# Patient Record
(demographics unavailable — no encounter records)

---

## 2024-12-31 NOTE — HISTORY OF PRESENT ILLNESS
[FreeTextEntry1] : pt here for annual exam on cryselle uses it 2-3 months her lmp 12/24/2024 doing fine new dg in family her mother dged 1 year ago with naga saenz neg  pt is on vit

## 2024-12-31 NOTE — PHYSICAL EXAM
[Examination Of The Breasts] : a normal appearance [No Masses] : no breast masses were palpable [Labia Majora] : normal [Labia Minora] : normal [Erosion] : erosions [Normal] : normal [Uterine Adnexae] : normal

## 2024-12-31 NOTE — PLAN
[FreeTextEntry1] : pt has no issue with cycles with btb , or issue with bedikahs but has cx erosion pap done breast exam shown how to do  bcp reordered